# Patient Record
Sex: FEMALE | Race: WHITE | NOT HISPANIC OR LATINO | URBAN - METROPOLITAN AREA
[De-identification: names, ages, dates, MRNs, and addresses within clinical notes are randomized per-mention and may not be internally consistent; named-entity substitution may affect disease eponyms.]

---

## 2017-01-03 ENCOUNTER — GENERIC CONVERSION - ENCOUNTER (OUTPATIENT)
Dept: OTHER | Facility: OTHER | Age: 32
End: 2017-01-03

## 2017-02-23 ENCOUNTER — GENERIC CONVERSION - ENCOUNTER (OUTPATIENT)
Dept: OTHER | Facility: OTHER | Age: 32
End: 2017-02-23

## 2018-01-10 NOTE — MISCELLANEOUS
Message  Call from pt requesting vaccine records  Will pull fr medical records  Records faxed to       Active Problems    1  Birth Control Method - Oral Contraceptives   2  Encounter for gynecological examination without abnormal finding (V72 31) (Z01 419)   3  Surveillance for birth control, oral contraceptives (V25 41) (Z30 41)    Current Meds   1  Tri-Previfem 0 18/0 215/0 25 MG-35 MCG Oral Tablet; take 1 tablet by mouth daily; Therapy: [id number] to (Honey See)  Requested for: [id number]; Last   Rx:04Jan2017 Ordered    Allergies    1   No Known Drug Allergies    Signatures   Electronically signed by : Gissel Valladares RN; Feb 23 2017 11:11AM EST                       (Author)

## 2018-01-12 NOTE — MISCELLANEOUS
Message   Recorded as Task   Date: 01/03/2017 12:26 PM, Created By: Esperanza Velázquez   Task Name: Med Renewal Request   Assigned To: Mishel Rowan   Regarding Patient: Tabby Cohen, Status: Active   Mario Hernandez - 03 Jan 2017 12:26 PM     TASK CREATED  Caller: Self; Renew Medication; [phone] (Home)  Pt would like to know if she can do a 90 day refill on bc through [facility]   [facility] told [facility] needed to get a [facility] RX through us  Mishel Rowan - 99 Jan 2017 12:56 PM     TASK EDITED  sent to pharm with 90 day        Active Problems    1  Birth Control Method - Oral Contraceptives   2  Encounter for gynecological examination without abnormal finding (V72 31) (Z01 419)   3  Surveillance for birth control, oral contraceptives (V25 41) (Z30 41)    Current Meds   1  Tri-Previfem 0 18/0 215/0 25 MG-35 MCG Oral Tablet; take 1 tablet by mouth daily; Therapy: [id number] to (Evaluate:[id number])  Requested for: [id number]; Last   Rx:[id number] Ordered    Allergies    1   No Known Drug Allergies    Plan  SocHx: Birth Control Method - Oral Contraceptives, Surveillance for birth control, oral  contraceptives    · Tri-Previfem 0 18/0 215/0 25 MG-35 MCG Oral Tablet; take 1 tablet by mouth  daily    Signatures   Electronically signed by : Gayle Mann, ; Max  3 2017 12:56PM EST                       (Author)